# Patient Record
Sex: FEMALE | Race: WHITE | NOT HISPANIC OR LATINO | Employment: STUDENT | URBAN - METROPOLITAN AREA
[De-identification: names, ages, dates, MRNs, and addresses within clinical notes are randomized per-mention and may not be internally consistent; named-entity substitution may affect disease eponyms.]

---
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
## 2020-07-14 ENCOUNTER — OFFICE VISIT (OUTPATIENT)
Dept: PEDIATRICS | Facility: CLINIC | Age: 20
End: 2020-07-14
Payer: COMMERCIAL

## 2020-07-14 ENCOUNTER — LAB VISIT (OUTPATIENT)
Dept: LAB | Facility: HOSPITAL | Age: 20
End: 2020-07-14
Attending: PEDIATRICS
Payer: COMMERCIAL

## 2020-07-14 VITALS
TEMPERATURE: 98 F | BODY MASS INDEX: 23.91 KG/M2 | DIASTOLIC BLOOD PRESSURE: 73 MMHG | WEIGHT: 121.81 LBS | HEIGHT: 60 IN | HEART RATE: 125 BPM | OXYGEN SATURATION: 99 % | SYSTOLIC BLOOD PRESSURE: 122 MMHG

## 2020-07-14 DIAGNOSIS — Z00.00 ENCOUNTER FOR PREVENTIVE HEALTH EXAMINATION: ICD-10-CM

## 2020-07-14 DIAGNOSIS — Z00.00 ENCOUNTER FOR PREVENTIVE HEALTH EXAMINATION: Primary | ICD-10-CM

## 2020-07-14 LAB
CHOLEST SERPL-MCNC: 205 MG/DL (ref 120–199)
CHOLEST/HDLC SERPL: 4.2 {RATIO} (ref 2–5)
HDLC SERPL-MCNC: 49 MG/DL (ref 40–75)
HDLC SERPL: 23.9 % (ref 20–50)
LDLC SERPL CALC-MCNC: 134 MG/DL (ref 63–159)
NONHDLC SERPL-MCNC: 156 MG/DL
TRIGL SERPL-MCNC: 110 MG/DL (ref 30–150)

## 2020-07-14 PROCEDURE — 99395 PREV VISIT EST AGE 18-39: CPT | Mod: 25,S$GLB,, | Performed by: PEDIATRICS

## 2020-07-14 PROCEDURE — 80061 LIPID PANEL: CPT

## 2020-07-14 PROCEDURE — 36415 COLL VENOUS BLD VENIPUNCTURE: CPT | Mod: PO

## 2020-07-14 PROCEDURE — 86706 HEP B SURFACE ANTIBODY: CPT

## 2020-07-14 PROCEDURE — 90471 MENINGOCOCCAL B, OMV VACCINE: ICD-10-PCS | Mod: S$GLB,,, | Performed by: PEDIATRICS

## 2020-07-14 PROCEDURE — 86580 POCT TB SKIN TEST: ICD-10-PCS | Mod: S$GLB,,, | Performed by: PEDIATRICS

## 2020-07-14 PROCEDURE — 86580 TB INTRADERMAL TEST: CPT | Mod: S$GLB,,, | Performed by: PEDIATRICS

## 2020-07-14 PROCEDURE — 99395 PR PREVENTIVE VISIT,EST,18-39: ICD-10-PCS | Mod: 25,S$GLB,, | Performed by: PEDIATRICS

## 2020-07-14 PROCEDURE — 90620 MENB-4C VACCINE IM: CPT | Mod: S$GLB,,, | Performed by: PEDIATRICS

## 2020-07-14 PROCEDURE — 87491 CHLMYD TRACH DNA AMP PROBE: CPT

## 2020-07-14 PROCEDURE — 90620 MENINGOCOCCAL B, OMV VACCINE: ICD-10-PCS | Mod: S$GLB,,, | Performed by: PEDIATRICS

## 2020-07-14 PROCEDURE — 90471 IMMUNIZATION ADMIN: CPT | Mod: S$GLB,,, | Performed by: PEDIATRICS

## 2020-07-14 NOTE — PATIENT INSTRUCTIONS
Guarding Your Health and Safety  Accidents happen--and can cause more lasting damage to your health than illness or disease. You can prevent most accidents. Take steps now to guard your health and safety at home, at work, and on the road.    Staying healthy  Here are suggestions to help you stay healthy:   · Prevent the spread of disease. Wash your hands with soap before meals, after you use the toilet, after touching pets, and after being outdoors. Practice safe sex. Dont share personal care items like razors.  · When outside, protect skin by applying sunscreen.  · Brush and floss your teeth every day. Have your teeth cleaned every 6 months or as recommended by your dentist.  · Prepare food safely. Keep hot foods hot and cold foods cold. Clean cutting boards and counters with hot soapy water. Use disposable towels rather than sponges.  Staying safe at home and work  Here are some tips to follow:   · Store medicines, chemicals, and sharp items out of reach of children and pets.  · Lock up unloaded firearms and ammunition. Keep the locked ammunition in a different location than the guns. Hide the keys or combinations. If someone close to you has the potential for violence or suffers from severe depression or another mental illness, do not keep firearms in your home.   · Install smoke detectors outside the kitchen and the bedrooms. Place a smoke detector on every floor of the home including the basement. Test batteries twice a year. Test smoke alarms every month. Replace smoke alarms every 10 years.  · Install carbon monoxide detectors in the hallway outside the bedrooms in each separate sleeping area of the home. Avoid areas concealed by furniture or drapes.    · Use childproof fencing and steinberg around pools and spas. Never leave children by themselves.  · Always follow workplace safety rules.  Staying safe on the road  Here are tips for staying safe when on the road:   · Always use your seat belt. Make sure all  children 12 years old and younger are in proper car seats or safety restraints in the back seat.  · Carry flares, a flashlight, a first-aid kit, a blanket, and other emergency items in your car.  · Wear a helmet when riding a bike or a motorcycle.  Date Last Reviewed: 11/20/2015  © 0754-7387 [employer]. [address] [place] [address]. All rights reserved. This information is not intended as a substitute for professional medical care. Always follow your healthcare professional's instructions.

## 2020-07-14 NOTE — PROGRESS NOTES
History was provided by the patient.    Kaley Gomez is a 19 y.o. female who is here for this well-child visit.    Current Issues / Interval history:  Current concerns include none. 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    Past Medical History:  I have reviewed patient's past medical history and it is pertinent for History reviewed. No pertinent past medical history.    Review of Nutrition/Activity:  Balanced diet? Yes  Regular exercise? No  Drinking cow's milk and volume? No, about less than 1 cup daily     Review of Elimination:  Any issues with voiding? no  Any issues with bowel movements? no    Review of Sleep:  Sleep issues? no  Does patient snore? no    Review of Safety:   Use a seatbelt consistently? Yes  The patient denies any history of significant injuries.   Guns in the home? Yes, does have guns in the home that are locked in a safe, belongs to patient's dad    Dental:  Sees dentist consistently? Yes  Brushes teeth twice daily? Yes    Social Screening:   Home environment issues? no  Feels safe at home?  Yes  Parental & sibling relations: good  Where in school? [employer] nursing program  School performance: doing well; no concerns  Issues with peers at school or bullying? no  The patient has many healthy friendships.  The patient denies use of alcohol, tobacco, or illicit drugs.  The patient denies any present symptoms of depression or anxiety., SI Absent   Has an IUD, but still has a month menstrual cycle  Sexually active? Yes, 1 sexual partner in the past year, male, endorses consistent condom usage.    Review of Systems   Constitutional: Negative for activity change, appetite change, fatigue and fever.   HENT: Negative for congestion, postnasal drip, rhinorrhea, sneezing and sore throat.    Eyes: Negative for redness.   Respiratory: Negative for cough, shortness of breath and wheezing.    Cardiovascular:  Negative for chest pain.   Gastrointestinal: Negative for abdominal distention, abdominal pain, constipation, diarrhea, nausea and vomiting.   Genitourinary: Negative for decreased urine volume and dysuria.   Musculoskeletal: Negative for myalgias.   Skin: Negative for rash.   Neurological: Negative for headaches.   Psychiatric/Behavioral: Negative for suicidal ideas.       Physical Exam  Vitals signs and nursing note reviewed.   Constitutional:       Appearance: She is well-developed.   HENT:      Right Ear: Tympanic membrane normal.      Left Ear: Tympanic membrane normal.   Eyes:      Conjunctiva/sclera: Conjunctivae normal.      Pupils: Pupils are equal, round, and reactive to light.   Neck:      Musculoskeletal: Normal range of motion and neck supple.      Thyroid: No thyromegaly.   Cardiovascular:      Rate and Rhythm: Normal rate and regular rhythm.      Heart sounds: No murmur.   Pulmonary:      Effort: Pulmonary effort is normal.      Breath sounds: Normal breath sounds. No wheezing or rales.   Abdominal:      General: Bowel sounds are normal. There is no distension.      Palpations: Abdomen is soft.      Tenderness: There is no abdominal tenderness.   Musculoskeletal: Normal range of motion.   Lymphadenopathy:      Cervical: No cervical adenopathy.   Skin:     General: Skin is warm.      Capillary Refill: Capillary refill takes less than 2 seconds.      Findings: No rash.   Neurological:      Mental Status: She is alert and oriented to person, place, and time.      Motor: No abnormal muscle tone.         Assessment and Plan:   Encounter for preventive health examination  -     C. trachomatis/N. gonorrhoeae by AMP DNA [facility]; Urine  -     Lipid Panel; Future; Expected date: 07/14/2020  -     Meningococcal B, OMV Vaccine (Bexsero)  -     POCT TB Skin Test  -     Visual acuity screening  -     Hepatitis B Surface Ab, Qualitative; Future; Expected date: 07/14/2020      Immunizations per orders    Placed PPD  and hep B titer ordered per school forms. Screening lipid panel and urine screen ordered.     Anticipatory guidance: Violence/Injury Prevention: helmets, seat belts, sunscreen, insect repellent, Healthy Exercise and Diet: including avoid junk food, soda and juice, increase water intake, vegetables/fruit/whole grain, Substance Use/Abuse Prevention: peer pressure/risks of ETOH, nicotine, other ilicit drugs, designated , Puberty, safe sex, Oral Health [id number] cleanings, Mental Health: seek help for sadness, depression, anxiety, SI or HI    Growth chart reviewed.    Gave handout on well-child issues at this age.      Follow up in one year and as needed.

## 2020-07-15 ENCOUNTER — TELEPHONE (OUTPATIENT)
Dept: PEDIATRICS | Facility: CLINIC | Age: 20
End: 2020-07-15

## 2020-07-15 LAB — HBV SURFACE AB SER-ACNC: NEGATIVE M[IU]/ML

## 2020-07-15 NOTE — TELEPHONE ENCOUNTER
Called and spoke with patient in regards to lipid panel and hep b Ab test results. Discussed that patient needs to restart in the series because of negative Ab test. Instructed patient to call and can get series started on nurse only visit. Family expressed agreement and understanding of plan and all questions were answered.

## 2020-07-16 ENCOUNTER — CLINICAL SUPPORT (OUTPATIENT)
Dept: PEDIATRICS | Facility: CLINIC | Age: 20
End: 2020-07-16
Payer: COMMERCIAL

## 2020-07-16 DIAGNOSIS — Z23 NEED FOR VACCINATION: Primary | ICD-10-CM

## 2020-07-16 PROCEDURE — 90746 HEPATITIS B VACCINE ADULT IM: ICD-10-PCS | Mod: S$GLB,,, | Performed by: PEDIATRICS

## 2020-07-16 PROCEDURE — 99499 NO LOS: ICD-10-PCS | Mod: S$GLB,,, | Performed by: PEDIATRICS

## 2020-07-16 PROCEDURE — 90471 IMMUNIZATION ADMIN: CPT | Mod: S$GLB,,, | Performed by: PEDIATRICS

## 2020-07-16 PROCEDURE — 90471 HEPATITIS B VACCINE ADULT IM: ICD-10-PCS | Mod: S$GLB,,, | Performed by: PEDIATRICS

## 2020-07-16 PROCEDURE — 90746 HEPB VACCINE 3 DOSE ADULT IM: CPT | Mod: S$GLB,,, | Performed by: PEDIATRICS

## 2020-07-16 PROCEDURE — 99499 UNLISTED E&M SERVICE: CPT | Mod: S$GLB,,, | Performed by: PEDIATRICS

## 2020-07-17 ENCOUNTER — TELEPHONE (OUTPATIENT)
Dept: PEDIATRICS | Facility: CLINIC | Age: 20
End: 2020-07-17

## 2020-07-17 LAB
C TRACH DNA SPEC QL NAA+PROBE: NOT DETECTED
N GONORRHOEA DNA SPEC QL NAA+PROBE: NOT DETECTED

## 2020-08-11 ENCOUNTER — CLINICAL SUPPORT (OUTPATIENT)
Dept: URGENT CARE | Facility: CLINIC | Age: 20
End: 2020-08-11
Payer: COMMERCIAL

## 2020-08-11 DIAGNOSIS — Z78.9 NO KNOWN PROBLEMS: Primary | ICD-10-CM

## 2020-08-11 PROCEDURE — U0003 INFECTIOUS AGENT DETECTION BY NUCLEIC ACID (DNA OR RNA); SEVERE ACUTE RESPIRATORY SYNDROME CORONAVIRUS 2 (SARS-COV-2) (CORONAVIRUS DISEASE [COVID-19]), AMPLIFIED PROBE TECHNIQUE, MAKING USE OF HIGH THROUGHPUT TECHNOLOGIES AS DESCRIBED BY CMS-2020-01-R: HCPCS

## 2020-08-11 PROCEDURE — 99201 PR OFFICE/OUTPT VISIT,NEW,LEVL I: CPT | Mod: S$GLB,,, | Performed by: FAMILY MEDICINE

## 2020-08-11 PROCEDURE — 99201 PR OFFICE/OUTPT VISIT,NEW,LEVL I: ICD-10-PCS | Mod: S$GLB,,, | Performed by: FAMILY MEDICINE

## 2020-08-13 ENCOUNTER — TELEPHONE (OUTPATIENT)
Dept: URGENT CARE | Facility: CLINIC | Age: 20
End: 2020-08-13

## 2020-08-13 LAB — SARS-COV-2 RNA RESP QL NAA+PROBE: NOT DETECTED

## 2023-07-06 ENCOUNTER — PATIENT MESSAGE (OUTPATIENT)
Dept: PEDIATRICS | Facility: CLINIC | Age: 23
End: 2023-07-06
Payer: COMMERCIAL
